# Patient Record
Sex: MALE | Race: WHITE | NOT HISPANIC OR LATINO | URBAN - NONMETROPOLITAN AREA
[De-identification: names, ages, dates, MRNs, and addresses within clinical notes are randomized per-mention and may not be internally consistent; named-entity substitution may affect disease eponyms.]

---

## 2018-10-25 PROBLEM — H35.362: Noted: 2018-10-25

## 2018-10-25 PROBLEM — H35.412: Noted: 2018-10-25

## 2018-10-25 PROBLEM — Z96.1: Noted: 2018-10-25

## 2019-09-03 ENCOUNTER — IMPORTED ENCOUNTER (OUTPATIENT)
Dept: URBAN - NONMETROPOLITAN AREA CLINIC 1 | Facility: CLINIC | Age: 69
End: 2019-09-03

## 2019-09-03 PROCEDURE — 92012 INTRM OPH EXAM EST PATIENT: CPT

## 2019-09-03 NOTE — PATIENT DISCUSSION
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

## 2020-09-04 ENCOUNTER — IMPORTED ENCOUNTER (OUTPATIENT)
Dept: URBAN - NONMETROPOLITAN AREA CLINIC 1 | Facility: CLINIC | Age: 70
End: 2020-09-04

## 2020-09-04 PROBLEM — H35.362: Noted: 2020-09-04

## 2020-09-04 PROBLEM — Z96.1: Noted: 2020-09-04

## 2020-09-04 PROBLEM — H35.412: Noted: 2020-09-04

## 2020-09-04 PROCEDURE — 92015 DETERMINE REFRACTIVE STATE: CPT

## 2020-09-04 PROCEDURE — 92014 COMPRE OPH EXAM EST PT 1/>: CPT

## 2020-09-04 NOTE — PATIENT DISCUSSION
S/P PCIOL OU- Symf/LRI/ Mutifocal OD  Symf/toric OS  - Excellentmonitor for pcoOS 2 small Chor [place] monitor for changesmid periphal drusen OSmonitor for changes OS  raidal lattice monitor for thinning

## 2021-09-10 ENCOUNTER — IMPORTED ENCOUNTER (OUTPATIENT)
Dept: URBAN - NONMETROPOLITAN AREA CLINIC 1 | Facility: CLINIC | Age: 71
End: 2021-09-10

## 2021-09-10 PROCEDURE — 92014 COMPRE OPH EXAM EST PT 1/>: CPT

## 2021-09-10 NOTE — PATIENT DISCUSSION
S/P PCIOL OU- Symf/LRI/ Mutifocal OD  Symf/toric OS  - Excellentmonitor for pcoOS 2 small Chor [place] monitor for changesmid periphal drusen OSmonitor for changes OS  raidal lattice monitor for thinning

## 2022-04-09 ASSESSMENT — VISUAL ACUITY
OS_CC: 20/20
OS_CC: 20/20
OD_CC: 20/25
OD_CC: J1+
OS_CC: 20/20
OD_CC: 20/20
OU_CC: J1+
OD_CC: 20/25
OD_CC: 20/20-2
OU_CC: 20/20
OU_CC: 20/20
OS_CC: J1+
OS_CC: 20/20

## 2022-04-09 ASSESSMENT — TONOMETRY
OS_IOP_MMHG: 15
OD_IOP_MMHG: 15
OS_IOP_MMHG: 15
OD_IOP_MMHG: 15
OD_IOP_MMHG: 15
OS_IOP_MMHG: 15

## 2022-09-23 ENCOUNTER — ESTABLISHED PATIENT (OUTPATIENT)
Dept: RURAL CLINIC 1 | Facility: CLINIC | Age: 72
End: 2022-09-23

## 2022-09-23 DIAGNOSIS — H35.412: ICD-10-CM

## 2022-09-23 DIAGNOSIS — Z96.1: ICD-10-CM

## 2022-09-23 DIAGNOSIS — H35.362: ICD-10-CM

## 2022-09-23 PROCEDURE — 92014 COMPRE OPH EXAM EST PT 1/>: CPT

## 2022-09-23 ASSESSMENT — VISUAL ACUITY
OD_SC: 20/25
OS_SC: 20/20
OU_SC: 20/25
OU_SC: 20/20

## 2022-09-23 ASSESSMENT — TONOMETRY
OD_IOP_MMHG: 15
OS_IOP_MMHG: 15

## 2023-10-02 ENCOUNTER — ESTABLISHED PATIENT (OUTPATIENT)
Dept: RURAL CLINIC 1 | Facility: CLINIC | Age: 73
End: 2023-10-02

## 2023-10-02 DIAGNOSIS — Z96.1: ICD-10-CM

## 2023-10-02 DIAGNOSIS — H35.412: ICD-10-CM

## 2023-10-02 DIAGNOSIS — H26.493: ICD-10-CM

## 2023-10-02 DIAGNOSIS — H35.362: ICD-10-CM

## 2023-10-02 PROCEDURE — 92014 COMPRE OPH EXAM EST PT 1/>: CPT

## 2023-10-02 ASSESSMENT — VISUAL ACUITY
OD_SC: 20/20
OS_SC: 20/20

## 2023-10-02 ASSESSMENT — TONOMETRY
OS_IOP_MMHG: 17
OD_IOP_MMHG: 17

## 2024-01-17 ENCOUNTER — ESTABLISHED PATIENT (OUTPATIENT)
Dept: RURAL CLINIC 2 | Facility: CLINIC | Age: 74
End: 2024-01-17

## 2024-01-17 DIAGNOSIS — Z96.1: ICD-10-CM

## 2024-01-17 DIAGNOSIS — H43.813: ICD-10-CM

## 2024-01-17 DIAGNOSIS — H26.493: ICD-10-CM

## 2024-01-17 DIAGNOSIS — H52.4: ICD-10-CM

## 2024-01-17 PROCEDURE — 92015 DETERMINE REFRACTIVE STATE: CPT

## 2024-01-17 PROCEDURE — 92014 COMPRE OPH EXAM EST PT 1/>: CPT

## 2024-01-17 ASSESSMENT — VISUAL ACUITY
OS_SC: 20/20
OD_SC: 20/40+3

## 2024-01-17 ASSESSMENT — TONOMETRY
OS_IOP_MMHG: 17
OD_IOP_MMHG: 18

## 2024-02-26 ENCOUNTER — CONSULTATION/EVALUATION (OUTPATIENT)
Dept: RURAL CLINIC 1 | Facility: CLINIC | Age: 74
End: 2024-02-26

## 2024-02-26 DIAGNOSIS — H26.493: ICD-10-CM

## 2024-02-26 PROCEDURE — 99214 OFFICE O/P EST MOD 30 MIN: CPT

## 2024-02-26 PROCEDURE — 66821 AFTER CATARACT LASER SURGERY: CPT

## 2024-02-26 ASSESSMENT — VISUAL ACUITY
OS_SC: 20/20-2
OD_SC: 20/30

## 2024-02-26 ASSESSMENT — TONOMETRY
OS_IOP_MMHG: 17
OD_IOP_MMHG: 17

## 2024-03-25 ENCOUNTER — CLINIC PROCEDURE ONLY (OUTPATIENT)
Dept: RURAL CLINIC 1 | Facility: CLINIC | Age: 74
End: 2024-03-25

## 2024-03-25 DIAGNOSIS — H26.492: ICD-10-CM

## 2024-03-25 PROCEDURE — 66821 AFTER CATARACT LASER SURGERY: CPT

## 2024-03-25 ASSESSMENT — TONOMETRY
OS_IOP_MMHG: 15
OD_IOP_MMHG: 15

## 2024-03-25 ASSESSMENT — VISUAL ACUITY
OS_SC: 20/25
OD_SC: 20/20
OS_BAT: 20/30

## 2024-04-09 ENCOUNTER — POST-OP (OUTPATIENT)
Dept: RURAL CLINIC 2 | Facility: CLINIC | Age: 74
End: 2024-04-09

## 2024-04-09 DIAGNOSIS — Z96.1: ICD-10-CM

## 2024-04-09 PROCEDURE — 99024 POSTOP FOLLOW-UP VISIT: CPT

## 2024-04-09 ASSESSMENT — TONOMETRY
OD_IOP_MMHG: 16
OS_IOP_MMHG: 16

## 2024-04-09 ASSESSMENT — VISUAL ACUITY
OS_SC: 20/20
OD_SC: 20/25

## 2025-05-05 NOTE — PATIENT DISCUSSION
- Discussed the warning signs of retinal tear/detachment
- Follow up in 1 year for [facility]
- No retinal tears or breaks
- Not visually or functionally significant
- Patient to call with changes to vision
- Subacute
- Updated glasses rx given today
06/04/2020OS+0.75+1.27474+[id number]/20 -&nbsp;SN &nbsp; &nbsp; gutierrezk
Anti-reflective
CATARACTS, OU
Comments:Any Lens Combo
General:
Glasses Prescribed:
Lens Material:
Lens Treatment:
PVD OD
Progressive - Daily wearOD+1.00+1.5017+2.348401/20 -&nbsp;SN &nbsp; &nbsp; pbk
Scratch resistant
Slab Off:No
UV Protection
Vertex Distance:
spherecylinderaxisaddprismvertexVAInt VANVExaminer
independent

## 2025-05-16 ENCOUNTER — COMPREHENSIVE EXAM (OUTPATIENT)
Age: 75
End: 2025-05-16

## 2025-05-16 DIAGNOSIS — H35.361: ICD-10-CM

## 2025-05-16 DIAGNOSIS — Z96.1: ICD-10-CM

## 2025-05-16 DIAGNOSIS — H43.813: ICD-10-CM

## 2025-05-16 DIAGNOSIS — H52.4: ICD-10-CM

## 2025-05-16 DIAGNOSIS — H16.223: ICD-10-CM

## 2025-05-16 PROCEDURE — 92015 DETERMINE REFRACTIVE STATE: CPT

## 2025-05-16 PROCEDURE — 92134 CPTRZ OPH DX IMG PST SGM RTA: CPT

## 2025-05-16 PROCEDURE — 92014 COMPRE OPH EXAM EST PT 1/>: CPT
